# Patient Record
Sex: FEMALE | Race: ASIAN | URBAN - METROPOLITAN AREA
[De-identification: names, ages, dates, MRNs, and addresses within clinical notes are randomized per-mention and may not be internally consistent; named-entity substitution may affect disease eponyms.]

---

## 2017-05-02 ENCOUNTER — TRANSFERRED RECORDS (OUTPATIENT)
Dept: HEALTH INFORMATION MANAGEMENT | Facility: CLINIC | Age: 32
End: 2017-05-02

## 2017-05-02 LAB
HPV ABSTRACT: NORMAL
PAP-ABSTRACT: NORMAL

## 2018-06-15 NOTE — PROGRESS NOTES
SUBJECTIVE:   Morro Kovacs is a 32 year old female who presents to clinic today for the following health issues:    HPI     Current method of birth control is Nuva ring.  Menstrual cycles occur approx every 28 days.  It lasts approx 5 days.  Cramping is noted to be mild.  Other complaints include NONE.  LMP: 6/16/2018    OB/GYN HX:    Menarche age 11  Has tried other BC: Has tried Pill - had headaches/migraines  Concerned about STDs: NO    Relevant past medical history for birth control use.   Stroke or Heart Attack: NO  Blood clots: NO  Family history of blood clots: NO   Hypertension: NO  Diabetes: NO  Hyperlipidemia:  NO  Seizures: NO  Cancer: NO  Migraine/Vascular headaches: NO  Breast disease or lump: NO  Smoking: No  Age greater than 35: NO    She had a change in insurance and needed to switch provider.  Previously seen at [facility].  She is needing to re-insert her next NuvaRing on Saturday.  Currently has her period.  No side effects while on the NuvaRing.  She used to have Migraines on the Birth Control Pill but those resolved once off the pill.     She has a history of abnormal pap smear and underwent colposcopies without biopsy.  Per her OB/GYN at [facility] she is back to regular screening due in 2020 for next pap smear with HPV testing.     Problem list and histories reviewed & adjusted, as indicated.  Additional history: as documented      Patient Active Problem List   Diagnosis     Low grade squamous intraepithelial lesion (LGSIL) on Papanicolaou smear of cervix     Past Surgical History:   Procedure Laterality Date     COLPOSCOPY,BX CERVIX/ENDOCERV CURR  03/31/2015 2015 and 2016 no biopsy       Social History   Substance Use Topics     Smoking status: Never Smoker     Smokeless tobacco: Never Used     Alcohol use No     Family History   Problem Relation Age of Onset     Hypertension Mother      Diabetes Mother      Hypertension Father      Cerebrovascular Disease Father      Uterine Cancer Maternal  Grandmother      Colon Cancer Maternal Grandmother      Early 60's     Cerebrovascular Disease Maternal Grandfather          Current Outpatient Prescriptions   Medication Sig Dispense Refill     etonogestrel-ethinyl estradiol (NUVARING) 0.12-0.015 MG/24HR vaginal ring Insert 1 ring vaginally and leave in place for 3 consecutive weeks, then remove for 1 week. Repeat with new ring. 3 each 4     [DISCONTINUED] etonogestrel-ethinyl estradiol (NUVARING) 0.12-0.015 MG/24HR vaginal ring Insert 1 ring vaginally and leave in place for 3 consecutive weeks, then remove for 1 week. Repeat with new ring.       [DISCONTINUED] etonogestrel-ethinyl estradiol (NUVARING) 0.12-0.015 MG/24HR vaginal ring Insert 1 ring vaginally and leave in place for 3 consecutive weeks, then remove for 1 week. Repeat with new ring. 1 each 3       ROS:  Constitutional, HEENT, cardiovascular, pulmonary, gi and gu systems are negative, except as otherwise noted.    OBJECTIVE:     /68  Pulse 78  Temp 97.2  F (36.2  C) (Temporal)  Resp 16  Wt 163 lb 3.2 oz (74 kg)  LMP 06/16/2018  SpO2 98%  Breastfeeding? No  There is no height or weight on file to calculate BMI.  GENERAL: healthy, alert and no distress  RESP: lungs clear to auscultation - no rales, rhonchi or wheezes  CV: regular rate and rhythm, normal S1 S2, no S3 or S4, no murmur, click or rub, no peripheral edema and peripheral pulses strong  PSYCH: mentation appears normal, affect normal/bright    ASSESSMENT/PLAN:       ICD-10-CM    1. Encounter for surveillance of vaginal ring hormonal contraceptive device Z30.44 etonogestrel-ethinyl estradiol (NUVARING) 0.12-0.015 MG/24HR vaginal ring     DISCONTINUED: etonogestrel-ethinyl estradiol (NUVARING) 0.12-0.015 MG/24HR vaginal ring       Refilled her NuvaRing today. Reminded risks and side effects of the medicine were discussed including break through bleeding, nausea, breast tenderness and moodiness.     She is due for regular pap smears,  will have results abstracted.     Histories were updated today.     Follow-up 1 year, sooner if needed.     Options for treatment and follow-up care were reviewed with the patient and/or guardian. Patient and/or guardian engaged in the decision making process and verbalized understanding of the options discussed and agreed with the final plan.     Sudhir Alonso PA-C  [facility]

## 2018-06-19 ENCOUNTER — OFFICE VISIT (OUTPATIENT)
Dept: FAMILY MEDICINE | Facility: OTHER | Age: 33
End: 2018-06-19
Payer: COMMERCIAL

## 2018-06-19 ENCOUNTER — HEALTH MAINTENANCE LETTER (OUTPATIENT)
Age: 33
End: 2018-06-19

## 2018-06-19 VITALS
RESPIRATION RATE: 16 BRPM | OXYGEN SATURATION: 98 % | HEART RATE: 78 BPM | DIASTOLIC BLOOD PRESSURE: 68 MMHG | TEMPERATURE: 97.2 F | SYSTOLIC BLOOD PRESSURE: 104 MMHG | WEIGHT: 163.2 LBS

## 2018-06-19 DIAGNOSIS — Z30.44 ENCOUNTER FOR SURVEILLANCE OF VAGINAL RING HORMONAL CONTRACEPTIVE DEVICE: Primary | ICD-10-CM

## 2018-06-19 DIAGNOSIS — Z76.89 ENCOUNTER TO ESTABLISH CARE: ICD-10-CM

## 2018-06-19 PROCEDURE — 99203 OFFICE O/P NEW LOW 30 MIN: CPT | Performed by: PHYSICIAN ASSISTANT

## 2018-06-19 RX ORDER — ETONOGESTREL AND ETHINYL ESTRADIOL VAGINAL RING .015; .12 MG/D; MG/D
RING VAGINAL
COMMUNITY
Start: 2018-03-28 | End: 2018-06-19

## 2018-06-19 RX ORDER — ETONOGESTREL AND ETHINYL ESTRADIOL VAGINAL RING .015; .12 MG/D; MG/D
RING VAGINAL
Qty: 3 EACH | Refills: 4 | Status: SHIPPED | OUTPATIENT
Start: 2018-06-19

## 2018-06-19 RX ORDER — ETONOGESTREL AND ETHINYL ESTRADIOL VAGINAL RING .015; .12 MG/D; MG/D
RING VAGINAL
Qty: 1 EACH | Refills: 3 | Status: SHIPPED | OUTPATIENT
Start: 2018-06-19 | End: 2018-06-19

## 2018-06-19 ASSESSMENT — PAIN SCALES - GENERAL: PAINLEVEL: NO PAIN (0)

## 2018-06-19 NOTE — NURSING NOTE
Chief Complaint   Patient presents with     Establish Care     Panel Management     tdap, hiv, lee, mychart       Initial /68  Pulse 78  Temp 97.2  F (36.2  C) (Temporal)  Resp 16  Wt 163 lb 3.2 oz (74 kg)  LMP 06/16/2018  SpO2 98%  Breastfeeding? No There is no height or weight on file to calculate BMI.  Medication Reconciliation: complete  Lorna Carr MA

## 2018-06-19 NOTE — Clinical Note
Please abstract the following data from this visit with this patient into the appropriate field in Epic:  Pap smear done with [employer] - Abnormals but now qualifies for normal screening.

## 2018-06-19 NOTE — MR AVS SNAPSHOT
"              After Visit Summary   2018    Morro Kovacs    MRN: [id number]           Patient Information     Date Of Birth          1985        Visit Information        Provider Department      2018 10:00 AM Sudhir Alonso PA-C [facility]        Today's Diagnoses     Encounter for surveillance of vaginal ring hormonal contraceptive device    -  1    Encounter to establish care           Follow-ups after your visit        Follow-up notes from your care team     Return in about 1 year (around 2019).      Who to contact     If you have questions or need follow up information about today's clinic visit or your schedule please contact [facility] directly at [phone].  Normal or non-critical lab and imaging results will be communicated to you by MyChart, letter or phone within 4 business days after the clinic has received the results. If you do not hear from us within 7 days, please contact the clinic through [facility] or phone. If you have a critical or abnormal lab result, we will notify you by phone as soon as possible.  Submit refill requests through [employer] or call your pharmacy and they will forward the refill request to us. Please allow 3 business days for your refill to be completed.          Additional Information About Your Visit        MyChart Information     [employer] lets you send messages to your doctor, view your test results, renew your prescriptions, schedule appointments and more. To sign up, go to [email or web address] . Click on \"Log in\" on the left side of the screen, which will take you to the Welcome page. Then click on \"Sign up Now\" on the right side of the page.     You will be asked to enter the access code listed below, as well as some personal information. Please follow the directions to create your username and password.     Your access code is: [email or web address]  Expires: 2018  9:59 AM     Your access code will  in 90 days. If you " need help or a new code, please call your [place] clinic or [phone].        Care EveryWhere ID     This is your Care EveryWhere ID. This could be used by other organizations to access your [place] medical records  [id number]        Your Vitals Were     Pulse Temperature Respirations Last Period Pulse Oximetry Breastfeeding?    78 97.2  F (36.2  C) (Temporal) 16 06/16/2018 98% No       Blood Pressure from Last 3 Encounters:   06/19/18 104/68    Weight from Last 3 Encounters:   06/19/18 163 lb 3.2 oz (74 kg)              Today, you had the following     No orders found for display         Today's Medication Changes          These changes are accurate as of 6/19/18 10:35 AM.  If you have any questions, ask your nurse or doctor.               Start taking these medicines.        Dose/Directions    etonogestrel-ethinyl estradiol 0.12-0.015 MG/24HR vaginal ring   Commonly known as:  NUVARING   Used for:  Encounter for surveillance of vaginal ring hormonal contraceptive device   Started by:  Sudhir Alonso PA-C        Insert 1 ring vaginally and leave in place for 3 consecutive weeks, then remove for 1 week. Repeat with new ring.   Quantity:  3 each   Refills:  4            Where to get your medicines      These medications were sent to [address] [address]     Phone:  [phone]     etonogestrel-ethinyl estradiol 0.12-0.015 MG/24HR vaginal ring                Primary Care Provider Office Phone # Fax #    Sudhir Alonso PA-C [phone]       [address] [place] [id number]        Equal Access to Services     [facility] CenterLUDWIG : Hadii aad ku hadashjose Soguzman, waaxda luqadaha, qaybta kaalmada larry neal. So [facility] [phone].    ATENCIÓN: Si habla español, tiene a lyons disposición servicios gratuitos de asistencia lingüística. Llame al [phone].    We comply with applicable  federal civil rights laws and [place] laws. We do not discriminate on the basis of race, color, national origin, age, disability, sex, sexual orientation, or gender identity.            Thank you!     Thank you for choosing [facility]  for your care. Our goal is always to provide you with excellent care. Hearing back from our patients is one way we can continue to improve our services. Please take a few minutes to complete the written survey that you may receive in the mail after your visit with us. Thank you!             Your Updated Medication List - Protect others around you: Learn how to safely use, store and throw away your medicines at www.disposemymeds.org.          This list is accurate as of 6/19/18 10:35 AM.  Always use your most recent med list.                   Brand Name Dispense Instructions for use Diagnosis    etonogestrel-ethinyl estradiol 0.12-0.015 MG/24HR vaginal ring    NUVARING    3 each    Insert 1 ring vaginally and leave in place for 3 consecutive weeks, then remove for 1 week. Repeat with new ring.    Encounter for surveillance of vaginal ring hormonal contraceptive device

## 2019-06-21 ENCOUNTER — TELEPHONE (OUTPATIENT)
Dept: FAMILY MEDICINE | Facility: OTHER | Age: 34
End: 2019-06-21

## 2019-06-21 NOTE — TELEPHONE ENCOUNTER
Summary:    Patient is due/failing the following:   PHYSICAL    Action needed:   Patient needs office visit for Physical.    Type of outreach:    spoke to  ([facility]) on file.. Patient is no longer [facility] due to insurance change.    Questions for provider review:    None                                                                                                                                    Kaia Tirado       Chart routed to Care Team .        Panel Management Review      Patient has the following on her problem list: None      Composite cancer screening  Chart review shows that this patient is due/due soon for the following None